# Patient Record
Sex: MALE | Race: WHITE | NOT HISPANIC OR LATINO | Employment: FULL TIME | URBAN - METROPOLITAN AREA
[De-identification: names, ages, dates, MRNs, and addresses within clinical notes are randomized per-mention and may not be internally consistent; named-entity substitution may affect disease eponyms.]

---

## 2021-01-15 ENCOUNTER — TELEPHONE (OUTPATIENT)
Dept: HEMATOLOGY/ONCOLOGY | Facility: CLINIC | Age: 36
End: 2021-01-15

## 2021-08-02 ENCOUNTER — HISTORICAL (OUTPATIENT)
Dept: ADMINISTRATIVE | Facility: HOSPITAL | Age: 36
End: 2021-08-02

## 2021-09-02 LAB — SARS-COV-2 AG RESP QL IA.RAPID: NEGATIVE

## 2021-09-03 LAB
BUN SERPL-MCNC: 15.9 MG/DL (ref 8.9–20.6)
CALCIUM SERPL-MCNC: 9.5 MG/DL (ref 8.4–10.2)
CHLORIDE SERPL-SCNC: 106 MMOL/L (ref 98–107)
CO2 SERPL-SCNC: 24 MMOL/L (ref 22–29)
CREAT SERPL-MCNC: 0.87 MG/DL (ref 0.72–1.25)
CREAT/UREA NIT SERPL: 18
GLUCOSE SERPL-MCNC: 93 MG/DL (ref 74–100)
POTASSIUM SERPL-SCNC: 4 MMOL/L (ref 3.5–5.1)
SODIUM SERPL-SCNC: 136 MMOL/L (ref 136–145)

## 2021-09-07 ENCOUNTER — HISTORICAL (OUTPATIENT)
Dept: SURGERY | Facility: HOSPITAL | Age: 36
End: 2021-09-07

## 2022-04-10 ENCOUNTER — HISTORICAL (OUTPATIENT)
Dept: ADMINISTRATIVE | Facility: HOSPITAL | Age: 37
End: 2022-04-10
Payer: MEDICAID

## 2022-04-29 VITALS
BODY MASS INDEX: 33.1 KG/M2 | WEIGHT: 179.88 LBS | DIASTOLIC BLOOD PRESSURE: 78 MMHG | SYSTOLIC BLOOD PRESSURE: 121 MMHG | OXYGEN SATURATION: 100 % | HEIGHT: 62 IN

## 2022-05-01 NOTE — OP NOTE
Patient:   Kaushik Reynaga             MRN: [id number]            FIN: [id number]               Age:   36 years     Sex:  Male     :  1985   Associated Diagnoses:   None   Author:   Allan Valdes Jr, MD      SURGEON: Allan Valdes MD    PREOPERATIVE DIAGNOSIS:   Right knee loose body    POSTOPERATIVE DIAGNOSIS:   Right knee loose body, right knee lateral meniscus tear    PROCEDURE PERFORMED:  1.  Right knee arthroscopic loose body removal (4 loose bodies)  2.  Right knee arthroscopic partial lateral meniscectomy    ESTIMATED BLOOD LOSS: 10cc.    COMPLICATIONS: None.    TOURNIQUET TIME: About 30 minutes.    ANTIBIOTICS: Ancef     INDICATIONS FOR PROCEDURE: Kaushik is a 36 y.o. male who has had ongoing right knee pain. The patient has had to limit activity due to intermittent pain & occasional mechanical symptoms.  Radiographs showed multiple loose bodies which I felt would be amenable to arthroscopic removal.  The patient decided to opt for surgery after failing conservative management.    OPERATIVE REPORT: Kaushik was initially seen in the preoperative holding area where history and physical were reviewed without change. The operative leg was marked, consents were reviewed, and any questions were answered for the patient and family. The patient was then taken back to the operating room, placed supine on the operating table with all bony prominences padded. Then a nonsterile tourniquet was placed around the upper thigh. The patient was induced under general anesthesia. The operative lower extremity was prepped and draped in standard sterile fashion. A timeout led by the surgeon was performed, and preoperative antibiotics were given. The limp was exsanguinated with gravity. The tourniquet was raised to 100 mmHg over the systolic blood pressure.    The knee was then flexed and the inferolateral portal was created with an #11 blade scalpel.    The camera was introduced, using the blunt trocar, into the suprapatellar  pouch. The undersurface of the patella was found to have no chondral wear and the trochlear groove was found to have no chondral wear . The camera was then taken down into the lateral gutter, where a loose body was found.  This was removed with a grasper. The camera was then brought into the medial gutter, where no loose bodies and no plica were seen. The camera was then brought into the medial compartment.    An inferomedial portal was then localized with a spinal needle, and created using an #11 blade. The medial meniscus was probed and found to have a no tears.  The medial femoral condyle was found to have a isolated area of striped wear.  This was grade 2. The medial tibial plateau demonstrated no wear.    The camera was then turned to the notch, where the ACL was found to be intact.  There was a large loose body in the notch was removed with a grasper    Then the leg was brought into figure-of-four position. The camera was brought into the lateral compartment. The lateral meniscus was probed and found to have a small radial tear of the anterior body. A combination of baskets and shanelle were used to debride the meniscal flap down to a stable margin of approximately 85 percent remaining and then shaver was used to smooth the edges. The lateral femoral condyle was found to have no chondral wear. The lateral tibial plateau had no chondral wear.    I then put the arthroscope in the posterior medial compartment of the knee.  He had 2 loose bodies which were located here.  These were both removed with a grasper through the posterior medial portal.    The knee was once more examined for loose bodies, of which none were found. The knee was then evacuated of all fluids. The portals were closed with 3-0 monocyrl interrupted sutures and steristrips. 10mL of 0.25% Bupivicaine were infiltrated around each portal. A sterile dressing was placed, and the tourniquet was deflated after about 30 minutes. The patient was awakened  from anesthesia and taken to the postoperative care unit in stable condition.

## 2022-05-04 NOTE — HISTORICAL [facility] CERNER
This is a historical note converted from Javier. Formatting and pictures may have been removed.  Please reference Javier for original formatting and attached multimedia. Chief Complaint  Right knee pain and swelling for 2 days  History of Present Illness  Pt is a 36-year-old male, here today for right knee pain, swelling for 2 days. ?Patient denies any falls or trauma. ?States he is on his feet a lot at work. ?Rates pain as 6/10, currently taking ibuprofen with little relief.  ?  Review of Systems  Constitutional: negative except as stated in HPI  Eye: negative except as stated in HPI  ENT: negative except as stated in HPI  Respiratory: negative except as stated in HPI  Cardiovascular: negative except as stated in HPI  Gastrointestinal: negative except as stated in HPI  Genitourinary: negative except as stated in HPI  Hema/Lymph: negative except as stated in HPI  Endocrine: negative except as stated in HPI  Immunologic: negative except as stated in HPI  Musculoskeletal: negative except as stated in HPI  Integumentary: negative except as stated in HPI  Neurologic: negative except as stated in HPI  All Other ROS_ negative except as stated in HPI  ?  ?  Physical Exam  Vitals & Measurements  T:?36.6? ?C (Oral)? HR:?66(Peripheral)? RR:?20? BP:?119/80? SpO2:?99%?  HT:?158.00?cm? WT:?84.500?kg? BMI:?33.85?  MSK:?RightKNEE  General: No apparent distress;?morbid obesity  Inspection: limping;?full weight bearing;?skin and tissue swelling;?no erythema;?No contusion;?No atrophy / deconditioning noted- mild quad;?normal alignment  Palpation:Lateral and medial tenderness noted at medial joint lines and patellar tendon.;?No tenderness at lateral or medial retinaculum.Crepitus:?negative;?Normal temperature  ROM:?  Active Extension/Flexion (0-140): _0-110  ?????normal  Strength:?  ?????Flexion??4/5  ?????Extension??4/5  Special Tests:  ????? a)?Effusion Testing  ??????????Ballotable Effusion:??positive  ??????????Fluid  Wave:?negative  ???? b)?PatellarTesting??????????  ???????? Patellar Grind:??negative  ?????????Apprehension:?negative  ???????? Patella Facet Tenderness:?positive?????????  ??? c)?Meniscal Tear Assessment?  ??????????McMurrays:?positive  ???????? Appley compression:?positive  ????d)?Ligamentous Testing?????  ???????? ACL Anterior Drawer:?negative  ???????? Pivot Shift:?negative  ?????? ??PCL Posterior Drawer:?negative  Neurovascular:?Intact; 2+?distal pulse, sensation intact to light touch  Neuro/Psych: Awake, Alert, Oriented; Normal mood and affect  Lymphatic: No LAD  Skin and Soft Tissue: No bruising, No ecchymosis; No rash; Skin intact  Assessment/Plan  1.?Knee pain, right?M25.561  ?XR R knee-read by Halle REEDER ([facility]), no acute fracture or dislocation. ?Multiple round/oval corticated bony fragments in the knee joint may represent loose bodies, can be assessed by follow-up MRI. ?Moderate knee effusion.  ??Toradol 60 mg IM given in office.?Medication as prescribed, do not combine with other NSAIDs. Topical capsaicin as needed; Hot or cold therapy;?Activity as tolerated;? ?AROM exercises. First line treatment with daily?acetaminophen 1000 mg three times daily;?ER precautions.  Ordered:  diclofenac, 75 mg = 1 tab(s), Oral, BID, with food, # 30 tab(s), 0 Refill(s), Pharmacy: [id number], 158, cm, Height/Length Dosing, 08/02/21 20:34:00 CDT, 84.5, kg, Weight Dosing, 08/02/21 20:34:00 CDT  traMADol, 50 mg = 1 tab(s), Oral, q4hr, PRN PRN for pain, X 5 day(s), # 20 tab(s), 0 Refill(s), Pharmacy: [id number], 158, cm, Height/Length Dosing, 08/02/21 20:34:00 CDT, 84.5, kg, Weight Dosing, 08/02/21 20:34:00 CDT  injections IM/SQ, 08/02/21 21:20:00 CDT, 08/02/21 21:20:00 CDT, Knee pain, right  Patellar tendonitis  injections IM/SQ, 08/02/21 21:24:00 CDT, 08/02/21 21:24:00 CDT, Knee pain, right  Patellar tendonitis  Office/Outpatient Visit Level 4 Established 00613 PC, Knee pain, right  Patellar  tendonitis, 08/02/21 21:57:00 CDT  XR Knee Right 4 or More Views, Stat, 08/02/21 20:53:00 CDT, None, Ambulatory, Standing AP Lateral standing PA flexion standing Sunrise, Rad Type, Knee pain, right, Not Scheduled, 08/02/21 20:53:00 CDT  ?  2.?Patellar tendonitis?M76.50  ?as above  Ordered:  injections IM/SQ, 08/02/21 21:20:00 CDT, 08/02/21 21:20:00 CDT, Knee pain, right  Patellar tendonitis  injections IM/SQ, 08/02/21 21:24:00 CDT, 08/02/21 21:24:00 CDT, Knee pain, right  Patellar tendonitis  Office/Outpatient Visit Level 4 Established [id number] PC, Knee pain, right  Patellar tendonitis, 08/02/21 21:57:00 CDT  ?  Referrals  [facility] Internal Referral to Orthopedics Clinic, Specialty: Orthopedics, Reason: R knee pain, Start: 08/02/21 21:54:00 CDT   Problem List/Past Medical History  Ongoing  Obesity  Historical  No qualifying data  Medications  aripiprazole 10 mg oral tablet, 10 mg= 1 tab(s), Oral, Daily  Biktarvy oral tablet, 1 tab(s), Oral, Daily,? ?Not taking  clonazePAM 0.5 mg oral tablet, 0.5 mg= 1 tab(s), Oral, BID  diclofenac sodium 75 mg oral delayed release tablet, 75 mg= 1 tab(s), Oral, BID  Dovato 50 mg-300 mg oral tablet, 1 tab(s), Oral, Daily  Ultram 50 mg oral tablet, 50 mg= 1 tab(s), Oral, q4hr, PRN  Allergies  No Known Allergies  No Known Medication Allergies  Social History  Abuse/Neglect  No, No, Yes, 08/02/2021  Alcohol  Never, 10/24/2020  Employment/School  Employed, 08/02/2021  Exercise  Exercise frequency: 1-2 times/week. Exercise type: Walking., 08/02/2021  Financial/Legal Situation  None, Salary patient, 08/02/2021  Home/Environment  Lives with Significant other. Living situation: Home/Independent., 08/02/2021    Never in , 08/02/2021  Nutrition/Health  Regular, Good, 08/02/2021  Sexual  Sexually active: Yes. Number of current partners 1. Sexual orientation: Lesbian, donovan or homosexual. Gender Identity Identifies as male., 08/02/2021  Spiritual/Cultural  Anabaptism,  08/02/2021  Substance Use  Never, 10/24/2020  Tobacco  10 or more cigarettes (1/2 pack or more)/day in last 30 days, Cigarettes, No, 08/02/2021  Health Maintenance  Health Maintenance  ???Pending?(in the next year)  ??? ??OverDue  ??? ? ? ?Influenza Vaccine due??10/01/20??and every 1??day(s)  ??? ??Postponed?  ??? ? ? ?Tetanus Vaccine due??03/30/22??and every 10??year(s)  ??? ??Refused?  ??? ? ? ?Smoking Cessation due??01/01/21??Variable frequency  ??? ??Due In Future?  ??? ? ? ?ADL Screening not due until??10/24/21??and every 1??year(s)  ??? ? ? ?Obesity Screening not due until??01/01/22??and every 1??year(s)  ??? ? ? ?Alcohol Misuse Screening not due until??01/02/22??and every 1??year(s)  ???Satisfied?(in the past 1 year)  ??? ??Satisfied?  ??? ? ? ?ADL Screening on??10/24/20.??Satisfied by Tanisha Umana LPN.  ??? ? ? ?Alcohol Misuse Screening on??08/02/21.??Satisfied by Tanisha Umana LPN.  ??? ? ? ?Blood Pressure Screening on??08/02/21.??Satisfied by Tanisha Umana LPN.  ??? ? ? ?Body Mass Index Check on??08/02/21.??Satisfied by Ros Umana LPNe D.  ??? ? ? ?Depression Screening on??08/02/21.??Satisfied by Tanisha Umana LPN.  ??? ? ? ?Obesity Screening on??08/02/21.??Satisfied by Ros Umana LPNe D.  ??? ??Postponed?  ??? ? ? ?Tetanus Vaccine on??08/02/21.??Recorded by Tanisha Umana LPN??Reason: Postpone due to refusal  ??? ??Refused?  ??? ? ? ?Smoking Cessation on??08/02/21.??Recorded by Tanisha Umana LPN??Reason: Patient Refuses  ?

## 2023-06-12 ENCOUNTER — LAB VISIT (OUTPATIENT)
Dept: LAB | Facility: HOSPITAL | Age: 38
End: 2023-06-12
Attending: NURSE PRACTITIONER
Payer: MEDICAID

## 2023-06-12 DIAGNOSIS — Z82.49 FAMILY HISTORY OF EARLY CAD: Primary | ICD-10-CM

## 2023-06-12 LAB
CHOLEST SERPL-MCNC: 225 MG/DL
CHOLEST/HDLC SERPL: 7 {RATIO} (ref 0–5)
HDLC SERPL-MCNC: 34 MG/DL (ref 35–60)
LDLC SERPL CALC-MCNC: 170 MG/DL (ref 50–140)
TRIGL SERPL-MCNC: 105 MG/DL (ref 34–140)
VLDLC SERPL CALC-MCNC: 21 MG/DL

## 2023-06-12 PROCEDURE — 36415 COLL VENOUS BLD VENIPUNCTURE: CPT

## 2023-06-12 PROCEDURE — 80061 LIPID PANEL: CPT

## 2024-03-24 ENCOUNTER — OFFICE VISIT (OUTPATIENT)
Dept: URGENT CARE | Facility: CLINIC | Age: 39
End: 2024-03-24
Payer: COMMERCIAL

## 2024-03-24 VITALS
RESPIRATION RATE: 18 BRPM | OXYGEN SATURATION: 97 % | SYSTOLIC BLOOD PRESSURE: 136 MMHG | BODY MASS INDEX: 28.95 KG/M2 | TEMPERATURE: 98 F | HEIGHT: 65 IN | HEART RATE: 85 BPM | DIASTOLIC BLOOD PRESSURE: 82 MMHG | WEIGHT: 173.75 LBS

## 2024-03-24 DIAGNOSIS — S60.10XA SUBUNGUAL HEMATOMA OF FINGER OF RIGHT HAND, INITIAL ENCOUNTER: Primary | ICD-10-CM

## 2024-03-24 PROCEDURE — 11740 EVACUATION SUBUNGUAL HMTMA: CPT | Mod: S$PBB,,, | Performed by: FAMILY MEDICINE

## 2024-03-24 PROCEDURE — 11740 EVACUATION SUBUNGUAL HMTMA: CPT | Mod: PBBFAC | Performed by: FAMILY MEDICINE

## 2024-03-24 PROCEDURE — 99214 OFFICE O/P EST MOD 30 MIN: CPT | Mod: PBBFAC,25 | Performed by: FAMILY MEDICINE

## 2024-03-24 PROCEDURE — 99214 OFFICE O/P EST MOD 30 MIN: CPT | Mod: 25,S$PBB,, | Performed by: FAMILY MEDICINE

## 2024-03-24 RX ORDER — ALBUTEROL SULFATE 90 UG/1
AEROSOL, METERED RESPIRATORY (INHALATION)
COMMUNITY
Start: 2024-03-21

## 2024-03-24 RX ORDER — CETIRIZINE HYDROCHLORIDE 10 MG/1
10 TABLET ORAL
COMMUNITY
Start: 2024-01-14

## 2024-03-24 RX ORDER — ERGOCALCIFEROL 1.25 MG/1
50000 CAPSULE ORAL
COMMUNITY

## 2024-03-24 RX ORDER — OXCARBAZEPINE 300 MG/1
300 TABLET, FILM COATED ORAL 2 TIMES DAILY
COMMUNITY

## 2024-03-24 RX ORDER — CLONAZEPAM 1 MG/1
1 TABLET ORAL DAILY PRN
COMMUNITY

## 2024-03-24 RX ORDER — ARIPIPRAZOLE 10 MG/1
10 TABLET ORAL
COMMUNITY

## 2024-03-24 RX ORDER — CABOTEGRAVIR AND RILPIVIRINE 600-900/3
KIT INTRAMUSCULAR
COMMUNITY

## 2024-03-24 NOTE — PROGRESS NOTES
"Subjective:       Patient ID: Kaushik Reynaga is a 38 y.o. male.    Chief Complaint: Injury (States was closing safe and smashed Rt thumb in door at 1400. Last Tdap 5/17/2021. PCP appt tomorrow.)      Injury      38-year-old male with injury to right thumb at 2:00 p.m. today.  He smashed his thumb in a safe door.  He now has a lot of pressure and pain in his thumb.  His last tetanus shot was 05/17/2021.  Review of Systems   Integumentary:         As above         Objective:       Vital Signs  Temp: 97.9 °F (36.6 °C)  Pulse: 85  Resp: 18  SpO2: 97 %  BP: 136/82  Height and Weight  Height: 5' 4.57" (164 cm)  Weight: 78.8 kg (173 lb 11.6 oz)  BSA (Calculated - sq m): 1.89 sq meters  BMI (Calculated): 29.3  Weight in (lb) to have BMI = 25: 147.9]  Physical Exam  Vitals reviewed.   Constitutional:       Appearance: Normal appearance.   HENT:      Head: Normocephalic and atraumatic.   Eyes:      Extraocular Movements: Extraocular movements intact.      Conjunctiva/sclera: Conjunctivae normal.   Skin:     Comments: Subungual hematoma of right 1st finger   Neurological:      General: No focal deficit present.      Mental Status: He is alert.   Psychiatric:         Mood and Affect: Mood normal.         Behavior: Behavior normal.         Procedure:  Trephination right 1st finger  Diagnosis: Subungual hematoma of right 1st finger  Permit: Signed informed consent   Details:  Prepped with alcohol swabs.  18 gauge needle used to bur hole in base of thumb nail.  Approximately 3 mL of blood evacuated.  Wiped with gauze, dressed with Band-Aid.  EBL < 5 ml    Assessment:       Problem List Items Addressed This Visit    None  Visit Diagnoses       Subungual hematoma of finger of right hand, initial encounter    -  Primary            Plan:   Routine postprocedure instructions given   ER precautions  Follow-up with PCP   " asymptomatic

## 2024-07-05 ENCOUNTER — OFFICE VISIT (OUTPATIENT)
Dept: URGENT CARE | Facility: CLINIC | Age: 39
End: 2024-07-05
Payer: COMMERCIAL

## 2024-07-05 VITALS
DIASTOLIC BLOOD PRESSURE: 80 MMHG | HEIGHT: 65 IN | RESPIRATION RATE: 20 BRPM | TEMPERATURE: 98 F | HEART RATE: 72 BPM | SYSTOLIC BLOOD PRESSURE: 119 MMHG | BODY MASS INDEX: 28.82 KG/M2 | OXYGEN SATURATION: 96 % | WEIGHT: 173 LBS

## 2024-07-05 DIAGNOSIS — H10.9 CONJUNCTIVITIS OF RIGHT EYE, UNSPECIFIED CONJUNCTIVITIS TYPE: ICD-10-CM

## 2024-07-05 DIAGNOSIS — L30.9 PERIOCULAR DERMATITIS: Primary | ICD-10-CM

## 2024-07-05 PROCEDURE — 99215 OFFICE O/P EST HI 40 MIN: CPT | Mod: PBBFAC

## 2024-07-05 RX ORDER — HYDROCORTISONE 25 MG/G
OINTMENT TOPICAL 2 TIMES DAILY
Qty: 20 G | Refills: 0 | Status: SHIPPED | OUTPATIENT
Start: 2024-07-05 | End: 2024-07-12

## 2024-07-05 RX ORDER — ROSUVASTATIN CALCIUM 5 MG/1
5 TABLET, COATED ORAL NIGHTLY
COMMUNITY
Start: 2024-06-24

## 2024-07-05 RX ORDER — POLYMYXIN B SULFATE AND TRIMETHOPRIM 1; 10000 MG/ML; [USP'U]/ML
1 SOLUTION OPHTHALMIC EVERY 6 HOURS
Qty: 7.89 ML | Refills: 0 | Status: SHIPPED | OUTPATIENT
Start: 2024-07-05 | End: 2024-07-10

## 2024-07-05 NOTE — PROGRESS NOTES
"Subjective:       Patient ID: Kaushik Reynaga is a 39 y.o. male.    Vitals:  height is 5' 4.57" (1.64 m) and weight is 78.5 kg (173 lb). His oral temperature is 98 °F (36.7 °C). His blood pressure is 119/80 and his pulse is 72. His respiration is 20 and oxygen saturation is 96%.     Chief Complaint: Belepharitis (Patient reports right eyelid swelling, itching and pain x 1 day)    38 y/o white male presents to clinic alone, he complains of right eyelid swelling, itching, and pain worsening throughout the day. Denies any trauma or injury. Denies contact use or visual disturbances.         Constitution: Negative for fever.   Eyes:  Positive for eye discharge (clear), eye itching, eye pain, eye redness and eyelid swelling. Negative for eye trauma, foreign body in eye and photophobia.   Allergic/Immunologic: Negative for seasonal allergies.       Objective:      Physical Exam   Constitutional: He is oriented to person, place, and time. He appears well-developed. He is cooperative. He is easily aroused. He does not appear ill. awake  HENT:   Head: Normocephalic and atraumatic.   Nose: Nose normal.   Mouth/Throat: Oropharynx is clear and moist.   Eyes: Lids are everted and swept, no foreign bodies found. Right conjunctiva is injected. Extraocular movement intact vision grossly intact gaze aligned appropriately periorbital hyperpigmentation     Comments: Right periorbital swelling with erythematous rash. No dandruff or eye debris noted. +TTP   Neck: muscular tenderness present.   Abdominal: Normal appearance.   Neurological: He is alert, oriented to person, place, and time and easily aroused. Gait normal. GCS eye subscore is 4. GCS verbal subscore is 5. GCS motor subscore is 6.   Skin: Skin is warm, dry and rash. Capillary refill takes less than 2 seconds.   Psychiatric: His behavior is normal.   Nursing note and vitals reviewed.        Assessment:       1. Periocular dermatitis    2. Conjunctivitis of right eye, unspecified " conjunctivitis type          Plan:     Encouraged patient to participate in lid hygiene including massages, wash with mild unscented soap, ensure hands are clean. Will give course of Polytrim. If symptoms does not improve in one week follow up up with PCP or ophthalmologist.     Periocular dermatitis  -     hydrocortisone 2.5 % ointment; Apply topically 2 (two) times daily. for 7 days  Dispense: 20 g; Refill: 0    Conjunctivitis of right eye, unspecified conjunctivitis type  -     polymyxin B sulf-trimethoprim (POLYTRIM) 10,000 unit- 1 mg/mL Drop; Place 1 drop into the left eye every 6 (six) hours. for 5 days  Dispense: 7.89 mL; Refill: 0

## 2025-01-19 ENCOUNTER — OFFICE VISIT (OUTPATIENT)
Dept: URGENT CARE | Facility: CLINIC | Age: 40
End: 2025-01-19
Payer: COMMERCIAL

## 2025-01-19 VITALS
SYSTOLIC BLOOD PRESSURE: 132 MMHG | HEIGHT: 65 IN | OXYGEN SATURATION: 98 % | HEART RATE: 85 BPM | TEMPERATURE: 99 F | RESPIRATION RATE: 20 BRPM | WEIGHT: 171 LBS | DIASTOLIC BLOOD PRESSURE: 85 MMHG | BODY MASS INDEX: 28.49 KG/M2

## 2025-01-19 DIAGNOSIS — J06.9 VIRAL URI WITH COUGH: Primary | ICD-10-CM

## 2025-01-19 DIAGNOSIS — R09.89 SYMPTOMS OF UPPER RESPIRATORY INFECTION (URI): ICD-10-CM

## 2025-01-19 LAB
CTP QC/QA: YES
FLUAV AG UPPER RESP QL IA.RAPID: NOT DETECTED
FLUBV AG UPPER RESP QL IA.RAPID: NOT DETECTED
S PYO RRNA THROAT QL PROBE: NEGATIVE
SARS-COV-2 RNA RESP QL NAA+PROBE: NOT DETECTED

## 2025-01-19 PROCEDURE — 99215 OFFICE O/P EST HI 40 MIN: CPT | Mod: PBBFAC

## 2025-01-19 PROCEDURE — 87880 STREP A ASSAY W/OPTIC: CPT | Mod: PBBFAC

## 2025-01-19 PROCEDURE — 0240U COVID/FLU A&B PCR: CPT

## 2025-01-19 PROCEDURE — 99213 OFFICE O/P EST LOW 20 MIN: CPT | Mod: S$PBB,,,

## 2025-01-19 RX ORDER — METFORMIN HYDROCHLORIDE 500 MG/1
TABLET ORAL
COMMUNITY
Start: 2024-12-23

## 2025-01-19 NOTE — PROGRESS NOTES
"Subjective:       Patient ID: Kaushik Reynaga is a 39 y.o. male.    Vitals:  height is 5' 5" (1.651 m) and weight is 77.6 kg (171 lb). His oral temperature is 98.5 °F (36.9 °C). His blood pressure is 132/85 and his pulse is 85. His respiration is 20 and oxygen saturation is 98%.     Chief Complaint: URI (Cough, nasal congestion, body aches, headache, fatigue and sore throat. Symptoms started last night.)    38 y/o white male with hx of depression and undetected HIV presents to  alone, he is c/o symptoms onset yesteray, states worsening today while at work, denies any known ill expsoures.         Constitution: Negative for fever.   HENT:  Positive for congestion and sore throat. Negative for ear pain, trouble swallowing and voice change.    Respiratory:  Positive for cough and sputum production. Negative for shortness of breath, wheezing and asthma.    Allergic/Immunologic: Positive for immunocompromised state. Negative for asthma.   Neurological:  Positive for headaches.       Objective:      Physical Exam   Constitutional: He is oriented to person, place, and time. He appears well-developed. He is cooperative. He is easily aroused.  Non-toxic appearance. He does not appear ill. No distress. overweightawake  HENT:   Head: Normocephalic and atraumatic.   Ears:   Right Ear: Tympanic membrane normal.   Left Ear: A middle ear effusion is present.   Nose: Mucosal edema present.   Mouth/Throat: Uvula is midline. Posterior oropharyngeal erythema present.   Cardiovascular: Normal rate and normal heart sounds.   Pulmonary/Chest: Effort normal and breath sounds normal.   Abdominal: Normal appearance.   Lymphadenopathy:     He has no cervical adenopathy.   Neurological: He is alert, oriented to person, place, and time and easily aroused. Gait normal. GCS eye subscore is 4. GCS verbal subscore is 5. GCS motor subscore is 6.   Skin: Skin is warm, dry, intact and not diaphoretic. Capillary refill takes less than 2 seconds. "   Psychiatric: His behavior is normal.   Nursing note and vitals reviewed.        Assessment:       1. Viral URI with cough    2. Symptoms of upper respiratory infection (URI)          Plan:     Strep is negative, PCR is pending, staff will contact patient with any abnormal findings, encouraged to check patient's portal for updates, monitor for worsening symptoms, take OTC medications for symptom improvement.      Viral URI with cough    Symptoms of upper respiratory infection (URI)  -     COVID/FLU A&B PCR; Future; Expected date: 01/19/2025  -     POCT rapid strep A           Results for orders placed or performed in visit on 01/19/25   POCT rapid strep A    Collection Time: 01/19/25  2:29 PM   Result Value Ref Range    Rapid Strep A Screen Negative Negative     Acceptable Yes

## 2025-03-26 ENCOUNTER — OFFICE VISIT (OUTPATIENT)
Dept: URGENT CARE | Facility: CLINIC | Age: 40
End: 2025-03-26
Payer: COMMERCIAL

## 2025-03-26 VITALS
HEART RATE: 82 BPM | WEIGHT: 170.38 LBS | TEMPERATURE: 98 F | SYSTOLIC BLOOD PRESSURE: 117 MMHG | HEIGHT: 63 IN | RESPIRATION RATE: 18 BRPM | DIASTOLIC BLOOD PRESSURE: 71 MMHG | BODY MASS INDEX: 30.19 KG/M2 | OXYGEN SATURATION: 98 %

## 2025-03-26 DIAGNOSIS — H66.92 LEFT OTITIS MEDIA, UNSPECIFIED OTITIS MEDIA TYPE: ICD-10-CM

## 2025-03-26 DIAGNOSIS — U07.1 COVID-19 VIRUS DETECTED: ICD-10-CM

## 2025-03-26 DIAGNOSIS — U07.1 COVID-19: Primary | ICD-10-CM

## 2025-03-26 LAB
CTP QC/QA: YES
CTP QC/QA: YES
POC MOLECULAR INFLUENZA A AGN: NEGATIVE
POC MOLECULAR INFLUENZA B AGN: NEGATIVE
SARS-COV-2 RDRP RESP QL NAA+PROBE: POSITIVE

## 2025-03-26 PROCEDURE — 99214 OFFICE O/P EST MOD 30 MIN: CPT | Mod: S$PBB,,, | Performed by: FAMILY MEDICINE

## 2025-03-26 PROCEDURE — 87502 INFLUENZA DNA AMP PROBE: CPT | Mod: PBBFAC | Performed by: FAMILY MEDICINE

## 2025-03-26 PROCEDURE — 99214 OFFICE O/P EST MOD 30 MIN: CPT | Mod: PBBFAC | Performed by: FAMILY MEDICINE

## 2025-03-26 PROCEDURE — 87635 SARS-COV-2 COVID-19 AMP PRB: CPT | Mod: PBBFAC | Performed by: FAMILY MEDICINE

## 2025-03-26 RX ORDER — AMOXICILLIN AND CLAVULANATE POTASSIUM 875; 125 MG/1; MG/1
1 TABLET, FILM COATED ORAL 2 TIMES DAILY
Qty: 20 TABLET | Refills: 0 | Status: SHIPPED | OUTPATIENT
Start: 2025-03-26 | End: 2025-04-05

## 2025-03-26 NOTE — PROGRESS NOTES
"Subjective:       Patient ID: Kaushik Reynaga is a 39 y.o. male.    Chief Complaint: Nasal Congestion (Coughing, Body aches x 2 days)      HPI  39-year-old male with nasal congestion, cough, and body aches for 2 days.  He also has chills and sweats.  Also having left ear pain.  No known sick contacts.  Symptoms have not improved on over-the-counter medication.  Review of Systems   Constitutional:         As above   HENT:          As above   Respiratory:          As above         Objective:       Vital Signs  Temp: 98.4 °F (36.9 °C)  Temp Source: Oral  Pulse: 82  Resp: 18  SpO2: 98 %  BP: 117/71  Height and Weight  Height: 5' 3" (160 cm)  Weight: 77.3 kg (170 lb 6.4 oz)  BSA (Calculated - sq m): 1.85 sq meters  BMI (Calculated): 30.2  Weight in (lb) to have BMI = 25: 140.8]  Physical Exam  Vitals reviewed.   Constitutional:       Appearance: Normal appearance.   HENT:      Head: Normocephalic and atraumatic.      Ears:      Comments: Left TM is erythematous and bulging     Nose: Congestion present. No rhinorrhea.      Mouth/Throat:      Pharynx: Posterior oropharyngeal erythema present. No oropharyngeal exudate.   Eyes:      Extraocular Movements: Extraocular movements intact.      Conjunctiva/sclera: Conjunctivae normal.   Cardiovascular:      Rate and Rhythm: Normal rate and regular rhythm.      Heart sounds: Normal heart sounds.   Pulmonary:      Breath sounds: Normal breath sounds.   Skin:     General: Skin is warm and dry.   Neurological:      General: No focal deficit present.      Mental Status: He is alert.   Psychiatric:         Mood and Affect: Mood normal.         Behavior: Behavior normal.         Assessment:       Problem List Items Addressed This Visit    None  Visit Diagnoses         COVID-19    -  Primary    Relevant Orders    POCT Influenza A/B Molecular (Completed)    POCT COVID-19 Rapid Screening (Completed)      Left otitis media, unspecified otitis media type        Relevant Medications    " amoxicillin-clavulanate 875-125mg (AUGMENTIN) 875-125 mg per tablet            Plan:   COVID precautions  Encouraged antihistamines as needed  Encouraged ibuprofen/acetaminophen as needed  ER precautions  Follow-up with PCP

## 2025-03-26 NOTE — LETTER
March 26, 2025      [facility]  [address] [address]  Phone: [phone]       Patient: Kaushik Reynaga   YOB: 1985  Date of Visit: 03/26/2025    To Whom It May Concern:    Kaushik Reynaga  was at [facility] on 03/26/2025. The patient may return to work/school on 3/31/25 with no restrictions. If you have any questions or concerns, or if I can be of further assistance, please do not hesitate to contact me.    Sincerely,    CAROLINA Cedeno MD